# Patient Record
Sex: FEMALE | Race: WHITE | NOT HISPANIC OR LATINO | URBAN - METROPOLITAN AREA
[De-identification: names, ages, dates, MRNs, and addresses within clinical notes are randomized per-mention and may not be internally consistent; named-entity substitution may affect disease eponyms.]

---

## 2022-03-08 ENCOUNTER — LAB OUTSIDE AN ENCOUNTER (OUTPATIENT)
Dept: URBAN - METROPOLITAN AREA CLINIC 70 | Facility: CLINIC | Age: 57
End: 2022-03-08

## 2022-03-08 ENCOUNTER — CLAIMS CREATED FROM THE CLAIM WINDOW (OUTPATIENT)
Dept: URBAN - METROPOLITAN AREA CLINIC 70 | Facility: CLINIC | Age: 57
End: 2022-03-08
Payer: COMMERCIAL

## 2022-03-08 ENCOUNTER — WEB ENCOUNTER (OUTPATIENT)
Dept: URBAN - METROPOLITAN AREA CLINIC 70 | Facility: CLINIC | Age: 57
End: 2022-03-08

## 2022-03-08 ENCOUNTER — DASHBOARD ENCOUNTERS (OUTPATIENT)
Age: 57
End: 2022-03-08

## 2022-03-08 DIAGNOSIS — A04.9 BACTERIAL INTESTINAL INFECTION, UNSPECIFIED: ICD-10-CM

## 2022-03-08 DIAGNOSIS — R11.14 BILIOUS VOMITING WITH NAUSEA: ICD-10-CM

## 2022-03-08 DIAGNOSIS — K52.9 CHRONIC DIARRHEA: ICD-10-CM

## 2022-03-08 DIAGNOSIS — R10.84 GENERALIZED ABDOMINAL PAIN: ICD-10-CM

## 2022-03-08 DIAGNOSIS — Z12.11 COLON CANCER SCREENING: ICD-10-CM

## 2022-03-08 PROCEDURE — 99203 OFFICE O/P NEW LOW 30 MIN: CPT | Performed by: NURSE PRACTITIONER

## 2022-03-08 RX ORDER — DICYCLOMINE HYDROCHLORIDE 10 MG/1
2 CAPSULES CAPSULE ORAL THREE TIMES A DAY
OUTPATIENT

## 2022-03-08 RX ORDER — DICYCLOMINE HYDROCHLORIDE 10 MG/1
2 CAPSULES CAPSULE ORAL THREE TIMES A DAY
Status: ACTIVE | COMMUNITY

## 2022-03-08 NOTE — HPI-TODAY'S VISIT:
Patient is a 55 y/o female who presents today as an ER follow up. She reports intermittent episodes of generalized abdominal pain with N/V with acute on chronic diarrhea since 2016. Most recent episode was on 2/17/22 when she was evaluated for symptoms in ER with undergoing abdominal CT that showed no acute abnormality (diverticulosis w/o diverticulitis) and labs that were unremarkable (WBC, H/H, LFTs, and Lipase WNL). Has BMs x 6-7 per day with loose non-bloody stool. States she will occasionally see scant amount of BRB on TP after wiping. Symptoms are worse with stress. No recent travel or ill contacts. Took an antibiotic for a tooth abscess ~ 3 weeks prior to recent episode of symptoms. No prior EGD or colonoscopy. No NSAIDs or hx of DM. No Fhx of IBD or colon cancer. Uses marijuana ~once a month. Denies fever, CP, SOB, wt loss, constipation, or signs of active GI bleeding.

## 2022-04-12 ENCOUNTER — OFFICE VISIT (OUTPATIENT)
Dept: URBAN - METROPOLITAN AREA SURGERY CENTER 24 | Facility: SURGERY CENTER | Age: 57
End: 2022-04-12

## 2025-05-28 ENCOUNTER — P2P PATIENT RECORD (OUTPATIENT)
Age: 60
End: 2025-05-28

## 2025-06-11 ENCOUNTER — LAB OUTSIDE AN ENCOUNTER (OUTPATIENT)
Dept: URBAN - METROPOLITAN AREA CLINIC 70 | Facility: CLINIC | Age: 60
End: 2025-06-11

## 2025-06-11 ENCOUNTER — OFFICE VISIT (OUTPATIENT)
Dept: URBAN - METROPOLITAN AREA CLINIC 70 | Facility: CLINIC | Age: 60
End: 2025-06-11
Payer: COMMERCIAL

## 2025-06-11 DIAGNOSIS — R10.84 GENERALIZED ABDOMINAL PAIN: ICD-10-CM

## 2025-06-11 DIAGNOSIS — K52.9 CHRONIC DIARRHEA: ICD-10-CM

## 2025-06-11 DIAGNOSIS — R11.14 BILIOUS VOMITING WITH NAUSEA: ICD-10-CM

## 2025-06-11 DIAGNOSIS — Z12.11 COLON CANCER SCREENING: ICD-10-CM

## 2025-06-11 PROCEDURE — 99214 OFFICE O/P EST MOD 30 MIN: CPT | Performed by: NURSE PRACTITIONER

## 2025-06-11 RX ORDER — DICYCLOMINE HYDROCHLORIDE 10 MG/1
2 CAPSULES CAPSULE ORAL THREE TIMES A DAY
OUTPATIENT
Start: 2025-06-11

## 2025-06-11 RX ORDER — DICYCLOMINE HYDROCHLORIDE 10 MG/1
2 CAPSULES CAPSULE ORAL THREE TIMES A DAY
Status: DISCONTINUED | COMMUNITY

## 2025-06-11 NOTE — HPI-TODAY'S VISIT:
OV 3/8/22: Patient is a 55 y/o female who presents today as an ER follow up. She reports intermittent episodes of generalized abdominal pain with N/V with acute on chronic diarrhea since 2016. Most recent episode was on 2/17/22 when she was evaluated for symptoms in ER with undergoing abdominal CT that showed no acute abnormality (diverticulosis w/o diverticulitis) and labs that were unremarkable (WBC, H/H, LFTs, and Lipase WNL). Has BMs x 6-7 per day with loose non-bloody stool. States she will occasionally see scant amount of BRB on TP after wiping. Symptoms are worse with stress. No recent travel or ill contacts. Took an antibiotic for a tooth abscess ~ 3 weeks prior to recent episode of symptoms. No prior EGD or colonoscopy. No NSAIDs or hx of DM. No Fhx of IBD or colon cancer. Uses marijuana ~once a month. Denies fever, CP, SOB, wt loss, constipation, or signs of active GI bleeding. ------------------------- Today 6/11/25: Patient presents today for continued chronic GI complaints. Workup ordered in 2022 including EGD/colonsocopy was not completed. She c/o continued diarrhea, generalized abdominal discomfort, and inttermittent N/V. Has gained wt. Denies fever, wt loss, dysphagia, constipation, or signs of GI bleeding.

## 2025-06-17 ENCOUNTER — TELEPHONE ENCOUNTER (OUTPATIENT)
Dept: URBAN - METROPOLITAN AREA CLINIC 70 | Facility: CLINIC | Age: 60
End: 2025-06-17

## 2025-06-30 LAB
ENDOMYSIAL ANTIBODY IGA: NEGATIVE
IMMUNOGLOBULIN A, QN, SERUM: 103
T-TRANSGLUTAMINASE (TTG) IGA: <2
T4,FREE(DIRECT): 1
TSH: 2.24

## 2025-07-11 ENCOUNTER — CLAIMS CREATED FROM THE CLAIM WINDOW (OUTPATIENT)
Dept: URBAN - METROPOLITAN AREA CLINIC 4 | Facility: CLINIC | Age: 60
End: 2025-07-11
Payer: COMMERCIAL

## 2025-07-11 ENCOUNTER — CLAIMS CREATED FROM THE CLAIM WINDOW (OUTPATIENT)
Dept: URBAN - METROPOLITAN AREA SURGERY CENTER 24 | Facility: SURGERY CENTER | Age: 60
End: 2025-07-11
Payer: COMMERCIAL

## 2025-07-11 DIAGNOSIS — R19.7 CHRONIC DIARRHEA: ICD-10-CM

## 2025-07-11 DIAGNOSIS — R10.13 EPIGASTRIC ABDOMINAL PAIN: ICD-10-CM

## 2025-07-11 DIAGNOSIS — R11.2 NAUSEA & VOMITING: ICD-10-CM

## 2025-07-11 DIAGNOSIS — K63.89 OTHER SPECIFIED DISEASES OF INTESTINE: ICD-10-CM

## 2025-07-11 DIAGNOSIS — F17.210 CIGARETTE SMOKER: ICD-10-CM

## 2025-07-11 DIAGNOSIS — K63.5 BENIGN COLON POLYP: ICD-10-CM

## 2025-07-11 DIAGNOSIS — K22.89 OTHER SPECIFIED DISEASE OF ESOPHAGUS: ICD-10-CM

## 2025-07-11 DIAGNOSIS — R10.13 EPIGASTRIC PAIN: ICD-10-CM

## 2025-07-11 DIAGNOSIS — K62.1 POLYP OF RECTUM: ICD-10-CM

## 2025-07-11 PROCEDURE — 88300 SURGICAL PATH GROSS: CPT | Performed by: PATHOLOGY

## 2025-07-11 PROCEDURE — 88305 TISSUE EXAM BY PATHOLOGIST: CPT | Performed by: PATHOLOGY

## 2025-07-11 PROCEDURE — 43239 EGD BIOPSY SINGLE/MULTIPLE: CPT | Performed by: INTERNAL MEDICINE

## 2025-07-11 PROCEDURE — 00813 ANES UPR LWR GI NDSC PX: CPT | Performed by: NURSE ANESTHETIST, CERTIFIED REGISTERED

## 2025-07-11 PROCEDURE — 45385 COLONOSCOPY W/LESION REMOVAL: CPT | Performed by: INTERNAL MEDICINE

## 2025-07-11 PROCEDURE — 88342 IMHCHEM/IMCYTCHM 1ST ANTB: CPT | Performed by: PATHOLOGY

## 2025-07-11 PROCEDURE — 45380 COLONOSCOPY AND BIOPSY: CPT | Performed by: INTERNAL MEDICINE

## 2025-07-11 PROCEDURE — 88313 SPECIAL STAINS GROUP 2: CPT | Performed by: PATHOLOGY

## 2025-07-11 RX ORDER — DICYCLOMINE HYDROCHLORIDE 10 MG/1
2 CAPSULES CAPSULE ORAL THREE TIMES A DAY
Status: ACTIVE | COMMUNITY
Start: 2025-06-11

## 2025-07-30 ENCOUNTER — LAB OUTSIDE AN ENCOUNTER (OUTPATIENT)
Dept: URBAN - METROPOLITAN AREA CLINIC 70 | Facility: CLINIC | Age: 60
End: 2025-07-30

## 2025-07-30 ENCOUNTER — OFFICE VISIT (OUTPATIENT)
Dept: URBAN - METROPOLITAN AREA CLINIC 70 | Facility: CLINIC | Age: 60
End: 2025-07-30
Payer: COMMERCIAL

## 2025-07-30 DIAGNOSIS — K22.9 IRREGULAR Z LINE OF ESOPHAGUS: ICD-10-CM

## 2025-07-30 DIAGNOSIS — Z12.11 COLON CANCER SCREENING: ICD-10-CM

## 2025-07-30 DIAGNOSIS — R10.84 GENERALIZED ABDOMINAL PAIN: ICD-10-CM

## 2025-07-30 DIAGNOSIS — R11.14 BILIOUS VOMITING WITH NAUSEA: ICD-10-CM

## 2025-07-30 DIAGNOSIS — K52.9 CHRONIC DIARRHEA: ICD-10-CM

## 2025-07-30 PROCEDURE — 99214 OFFICE O/P EST MOD 30 MIN: CPT | Performed by: NURSE PRACTITIONER

## 2025-07-30 RX ORDER — ONDANSETRON HYDROCHLORIDE 4 MG/1
1 TABLET TABLET, FILM COATED ORAL
Qty: 10 | Refills: 0 | OUTPATIENT
Start: 2025-07-30

## 2025-07-30 RX ORDER — OMEPRAZOLE 40 MG/1
1 CAPSULE 30 MINUTES BEFORE MORNING MEAL CAPSULE, DELAYED RELEASE ORAL ONCE A DAY
Qty: 90 | Refills: 3 | OUTPATIENT
Start: 2025-07-30

## 2025-07-30 RX ORDER — DICYCLOMINE HYDROCHLORIDE 10 MG/1
2 CAPSULES CAPSULE ORAL THREE TIMES A DAY
Status: ACTIVE | COMMUNITY
Start: 2025-06-11

## 2025-07-30 NOTE — HPI-TODAY'S VISIT:
OV 3/8/22: Patient is a 57 y/o female who presents today as an ER follow up. She reports intermittent episodes of generalized abdominal pain with N/V with acute on chronic diarrhea since 2016. Most recent episode was on 2/17/22 when she was evaluated for symptoms in ER with undergoing abdominal CT that showed no acute abnormality (diverticulosis w/o diverticulitis) and labs that were unremarkable (WBC, H/H, LFTs, and Lipase WNL). Has BMs x 6-7 per day with loose non-bloody stool. States she will occasionally see scant amount of BRB on TP after wiping. Symptoms are worse with stress. No recent travel or ill contacts. Took an antibiotic for a tooth abscess ~ 3 weeks prior to recent episode of symptoms. No prior EGD or colonoscopy. No NSAIDs or hx of DM. No Fhx of IBD or colon cancer. Uses marijuana ~once a month. Denies fever, CP, SOB, wt loss, constipation, or signs of active GI bleeding. ------------------------- OV 6/11/25: Patient presents today for continued chronic GI complaints. Workup ordered in 2022 including EGD/colonsocopy was not completed. She c/o continued diarrhea, generalized abdominal discomfort, and inttermittent N/V. Has gained wt. Denies fever, wt loss, dysphagia, constipation, or signs of GI bleeding. ----------------------- Today 7/30/25: Patient presents today for follow up. KUB 5/28/25 showed constipation but no acute process. Labs/stool studies negative for cause of diarrhea. Underwent EGGD/colonoscopy 7/11/25 with findings of irregular z-line (bx not received), small HH, benign colon polyp, and diverticulosis (random bx negative) with recall on EGD in 3 years and colonoscopy in 10 years. Results discussed with patient. She reports continued N/V, bloating, and diarrhea. No new GI complaints.